# Patient Record
Sex: FEMALE | Race: WHITE
[De-identification: names, ages, dates, MRNs, and addresses within clinical notes are randomized per-mention and may not be internally consistent; named-entity substitution may affect disease eponyms.]

---

## 2018-07-26 ENCOUNTER — HOSPITAL ENCOUNTER (OUTPATIENT)
Dept: HOSPITAL 19 - MC.RAD | Age: 68
End: 2018-07-26
Attending: INTERNAL MEDICINE
Payer: COMMERCIAL

## 2018-07-26 DIAGNOSIS — Z41.1: ICD-10-CM

## 2018-07-26 DIAGNOSIS — R92.1: ICD-10-CM

## 2018-07-26 DIAGNOSIS — N64.53: ICD-10-CM

## 2018-07-26 DIAGNOSIS — Z12.31: Primary | ICD-10-CM

## 2021-11-03 ENCOUNTER — HOSPITAL ENCOUNTER (OUTPATIENT)
Dept: HOSPITAL 19 - MC.RAD | Age: 71
End: 2021-11-03
Attending: INTERNAL MEDICINE
Payer: MEDICARE

## 2021-11-03 DIAGNOSIS — R92.0: ICD-10-CM

## 2021-11-03 DIAGNOSIS — N63.10: ICD-10-CM

## 2021-11-03 DIAGNOSIS — Z98.890: ICD-10-CM

## 2021-11-03 DIAGNOSIS — Z12.31: Primary | ICD-10-CM

## 2021-11-11 ENCOUNTER — HOSPITAL ENCOUNTER (OUTPATIENT)
Dept: HOSPITAL 19 - MC.RAD | Age: 71
End: 2021-11-11
Attending: NURSE PRACTITIONER
Payer: MEDICARE

## 2021-11-11 DIAGNOSIS — N64.1: Primary | ICD-10-CM

## 2021-11-11 DIAGNOSIS — N63.10: ICD-10-CM

## 2021-11-17 ENCOUNTER — HOSPITAL ENCOUNTER (OUTPATIENT)
Dept: HOSPITAL 19 - MC.RAD | Age: 71
End: 2021-11-17
Attending: NURSE PRACTITIONER
Payer: MEDICARE

## 2021-11-17 DIAGNOSIS — N63.10: Primary | ICD-10-CM

## 2021-11-17 DIAGNOSIS — Z98.82: ICD-10-CM

## 2022-01-03 ENCOUNTER — HOSPITAL ENCOUNTER (OUTPATIENT)
Dept: HOSPITAL 19 - MC.RAD | Age: 72
End: 2022-01-03
Attending: SURGERY
Payer: MEDICARE

## 2022-01-03 DIAGNOSIS — R59.0: ICD-10-CM

## 2022-01-03 DIAGNOSIS — C50.911: Primary | ICD-10-CM

## 2022-01-03 PROCEDURE — A9520 TC99 TILMANOCEPT DIAG 0.5MCI: HCPCS

## 2022-01-04 ENCOUNTER — HOSPITAL ENCOUNTER (OUTPATIENT)
Dept: HOSPITAL 19 - SDCO | Age: 72
Discharge: HOME | End: 2022-01-04
Attending: SURGERY
Payer: MEDICARE

## 2022-01-04 VITALS — SYSTOLIC BLOOD PRESSURE: 126 MMHG | DIASTOLIC BLOOD PRESSURE: 51 MMHG | HEART RATE: 64 BPM

## 2022-01-04 VITALS — SYSTOLIC BLOOD PRESSURE: 130 MMHG | TEMPERATURE: 98 F | DIASTOLIC BLOOD PRESSURE: 49 MMHG | HEART RATE: 63 BPM

## 2022-01-04 VITALS — HEART RATE: 63 BPM | TEMPERATURE: 97.5 F | DIASTOLIC BLOOD PRESSURE: 59 MMHG | SYSTOLIC BLOOD PRESSURE: 139 MMHG

## 2022-01-04 VITALS — SYSTOLIC BLOOD PRESSURE: 106 MMHG | DIASTOLIC BLOOD PRESSURE: 89 MMHG | HEART RATE: 65 BPM

## 2022-01-04 VITALS — SYSTOLIC BLOOD PRESSURE: 140 MMHG | HEART RATE: 66 BPM | DIASTOLIC BLOOD PRESSURE: 48 MMHG

## 2022-01-04 VITALS — BODY MASS INDEX: 45.55 KG/M2 | HEIGHT: 62.99 IN | WEIGHT: 257.06 LBS

## 2022-01-04 VITALS — SYSTOLIC BLOOD PRESSURE: 97 MMHG | DIASTOLIC BLOOD PRESSURE: 41 MMHG | HEART RATE: 66 BPM

## 2022-01-04 VITALS — DIASTOLIC BLOOD PRESSURE: 88 MMHG | SYSTOLIC BLOOD PRESSURE: 105 MMHG | HEART RATE: 65 BPM

## 2022-01-04 VITALS — TEMPERATURE: 97.5 F

## 2022-01-04 DIAGNOSIS — G47.33: ICD-10-CM

## 2022-01-04 DIAGNOSIS — Z99.89: ICD-10-CM

## 2022-01-04 DIAGNOSIS — Z80.0: ICD-10-CM

## 2022-01-04 DIAGNOSIS — I10: ICD-10-CM

## 2022-01-04 DIAGNOSIS — Z79.82: ICD-10-CM

## 2022-01-04 DIAGNOSIS — Z79.890: ICD-10-CM

## 2022-01-04 DIAGNOSIS — Z80.3: ICD-10-CM

## 2022-01-04 DIAGNOSIS — E78.00: ICD-10-CM

## 2022-01-04 DIAGNOSIS — G47.30: ICD-10-CM

## 2022-01-04 DIAGNOSIS — Z90.710: ICD-10-CM

## 2022-01-04 DIAGNOSIS — Z79.899: ICD-10-CM

## 2022-01-04 DIAGNOSIS — E11.9: ICD-10-CM

## 2022-01-04 DIAGNOSIS — Z79.4: ICD-10-CM

## 2022-01-04 DIAGNOSIS — C50.911: Primary | ICD-10-CM

## 2022-01-04 PROCEDURE — A4648 IMPLANTABLE TISSUE MARKER: HCPCS

## 2022-01-04 NOTE — NUR
PATIENT TRANSPORTED PER CART FROM PACU TO [facility] ACCOMPANIED BY PACU RN TO BAY 1.
MONITORS APPLIED. O2 CONTINUES AT 2 LITERS PER NASAL CANNULA. VSS. PATIENT IS
ALERT AND TALKING WITH STAFF. DENIES DISCOMFORT AND NAUSEA. DRESSING TO CHEST
IS CDI.
1140 PATIENT GIVEN ICE WATER TO DRINK.

## 2022-01-04 NOTE — NUR
VSS ON ROOM AIR. PATIENT TALKS WITH STAFF.
1303 IV DC'D WITH CATHETER TIP INTACT. PRESSURE AND BANDAGE APPLIED.
1305 DISCHARGE INSTRUCTIONS GIVEN VERBAL AND DISCHARGE PACKET PROVIDED TO
PATIENT AND DAUGHTER. QUESTIONS ANSWERED AND THEY VOICED UNDERSTANDING.
PATIENT CHANGES INTO STREET CLOTHES.
1315 PATIENT DISCHARGED PER WHEELCHAIR ACCOMPANIED BY AMB RN TO PRIVATE
VECHILE DRIVEN BY DAUGHTER.

## 2022-01-04 NOTE — NUR
VSS ON 2 LITERS. PATIENT TALKS WITH DAUGHTER. PATIENT DENIES DISCOMFORT AND
NASUEA.
PATIENT GIVEN COFFEE AND MUFFIN TO EAT. PATIENT STATES THAT ARM IS WAKING UP.

## 2022-01-04 NOTE — NUR
BLOOD PRESSURE LOWER. CUFF TO ARM IS DISPLACED. PATIENT ALERT AND TALKING.
PATIENT DENIES LIGHTHEADNESS AND NAUSEA. PATIENT STATES SHE IS READY TO GO TO
BATHROOM.
PATIENT SITS ON SIDE OF CART AND WAITS. DENIES LIGHTHEADNESS. PATIENT STANDS
WITHOUT PROBLEMS. PATIENT AMBULATES WITH STEADY GAIT TO RESTROOM AND VOIDS
WITHOUT PROBLEMS. PATIENT AMBULATES BACK TO CART WITHOUT PROBLEMS AND SITS ON
CART.